# Patient Record
Sex: FEMALE | Race: WHITE | NOT HISPANIC OR LATINO
[De-identification: names, ages, dates, MRNs, and addresses within clinical notes are randomized per-mention and may not be internally consistent; named-entity substitution may affect disease eponyms.]

---

## 2021-07-07 ENCOUNTER — APPOINTMENT (OUTPATIENT)
Dept: HEART AND VASCULAR | Facility: CLINIC | Age: 66
End: 2021-07-07
Payer: MEDICARE

## 2021-07-07 ENCOUNTER — NON-APPOINTMENT (OUTPATIENT)
Age: 66
End: 2021-07-07

## 2021-07-07 VITALS
WEIGHT: 137 LBS | DIASTOLIC BLOOD PRESSURE: 86 MMHG | OXYGEN SATURATION: 99 % | HEART RATE: 93 BPM | HEIGHT: 65 IN | BODY MASS INDEX: 22.82 KG/M2 | SYSTOLIC BLOOD PRESSURE: 143 MMHG

## 2021-07-07 DIAGNOSIS — R06.00 DYSPNEA, UNSPECIFIED: ICD-10-CM

## 2021-07-07 DIAGNOSIS — R94.31 ABNORMAL ELECTROCARDIOGRAM [ECG] [EKG]: ICD-10-CM

## 2021-07-07 PROCEDURE — 99214 OFFICE O/P EST MOD 30 MIN: CPT

## 2021-07-07 PROCEDURE — 93000 ELECTROCARDIOGRAM COMPLETE: CPT

## 2021-07-09 PROBLEM — R06.00 DYSPNEA ON EXERTION: Status: ACTIVE | Noted: 2021-07-09

## 2021-07-09 PROBLEM — R94.31 ABNORMAL EKG: Status: ACTIVE | Noted: 2021-07-09

## 2021-07-23 ENCOUNTER — APPOINTMENT (OUTPATIENT)
Dept: HEART AND VASCULAR | Facility: CLINIC | Age: 66
End: 2021-07-23

## 2021-08-03 ENCOUNTER — FORM ENCOUNTER (OUTPATIENT)
Age: 66
End: 2021-08-03

## 2021-08-04 ENCOUNTER — OUTPATIENT (OUTPATIENT)
Dept: OUTPATIENT SERVICES | Facility: HOSPITAL | Age: 66
LOS: 1 days | End: 2021-08-04
Payer: MEDICARE

## 2021-08-04 DIAGNOSIS — R94.31 ABNORMAL ELECTROCARDIOGRAM [ECG] [EKG]: ICD-10-CM

## 2021-08-04 DIAGNOSIS — R06.00 DYSPNEA, UNSPECIFIED: ICD-10-CM

## 2021-08-04 PROCEDURE — 93306 TTE W/DOPPLER COMPLETE: CPT | Mod: 26

## 2021-08-04 PROCEDURE — 93306 TTE W/DOPPLER COMPLETE: CPT

## 2021-08-04 PROCEDURE — 93351 STRESS TTE COMPLETE: CPT | Mod: 26,52

## 2021-08-04 PROCEDURE — 93351 STRESS TTE COMPLETE: CPT

## 2021-08-17 ENCOUNTER — APPOINTMENT (OUTPATIENT)
Dept: HEART AND VASCULAR | Facility: CLINIC | Age: 66
End: 2021-08-17

## 2021-08-18 ENCOUNTER — TRANSCRIPTION ENCOUNTER (OUTPATIENT)
Age: 66
End: 2021-08-18

## 2021-09-10 ENCOUNTER — OUTPATIENT (OUTPATIENT)
Dept: OUTPATIENT SERVICES | Facility: HOSPITAL | Age: 66
LOS: 1 days | End: 2021-09-10
Payer: MEDICARE

## 2021-09-10 ENCOUNTER — APPOINTMENT (OUTPATIENT)
Dept: CT IMAGING | Facility: HOSPITAL | Age: 66
End: 2021-09-10
Payer: MEDICARE

## 2021-09-10 PROCEDURE — 75574 CT ANGIO HRT W/3D IMAGE: CPT

## 2021-09-10 PROCEDURE — 75574 CT ANGIO HRT W/3D IMAGE: CPT | Mod: 26,MH

## 2021-09-24 ENCOUNTER — APPOINTMENT (OUTPATIENT)
Dept: VASCULAR SURGERY | Facility: CLINIC | Age: 66
End: 2021-09-24
Payer: MEDICARE

## 2021-09-24 PROCEDURE — 93923 UPR/LXTR ART STDY 3+ LVLS: CPT

## 2021-09-26 ENCOUNTER — EMERGENCY (EMERGENCY)
Facility: HOSPITAL | Age: 66
LOS: 1 days | Discharge: HOME | End: 2021-09-26
Admitting: EMERGENCY MEDICINE
Payer: MEDICARE

## 2021-09-26 VITALS
DIASTOLIC BLOOD PRESSURE: 90 MMHG | RESPIRATION RATE: 20 BRPM | TEMPERATURE: 98 F | HEART RATE: 84 BPM | WEIGHT: 132.94 LBS | OXYGEN SATURATION: 98 % | SYSTOLIC BLOOD PRESSURE: 162 MMHG | HEIGHT: 65 IN

## 2021-09-26 DIAGNOSIS — Z20.822 CONTACT WITH AND (SUSPECTED) EXPOSURE TO COVID-19: ICD-10-CM

## 2021-09-26 PROCEDURE — 99282 EMERGENCY DEPT VISIT SF MDM: CPT | Mod: CS

## 2021-09-26 NOTE — ED PROVIDER NOTE - PATIENT PORTAL LINK FT
You can access the FollowMyHealth Patient Portal offered by [facility] by registering at the following website: [email or web address]. By joining "[employer]’s FollowMyHealth portal, you will also be able to view your health information using other applications (apps) compatible with our system.

## 2021-10-06 ENCOUNTER — APPOINTMENT (OUTPATIENT)
Dept: HEART AND VASCULAR | Facility: CLINIC | Age: 66
End: 2021-10-06
Payer: MEDICARE

## 2021-10-06 VITALS
WEIGHT: 133 LBS | DIASTOLIC BLOOD PRESSURE: 88 MMHG | BODY MASS INDEX: 22.16 KG/M2 | HEART RATE: 86 BPM | HEIGHT: 65 IN | SYSTOLIC BLOOD PRESSURE: 144 MMHG | OXYGEN SATURATION: 100 %

## 2021-10-06 VITALS — DIASTOLIC BLOOD PRESSURE: 78 MMHG | SYSTOLIC BLOOD PRESSURE: 137 MMHG

## 2021-10-06 DIAGNOSIS — Z78.9 OTHER SPECIFIED HEALTH STATUS: ICD-10-CM

## 2021-10-06 DIAGNOSIS — E03.9 HYPOTHYROIDISM, UNSPECIFIED: ICD-10-CM

## 2021-10-06 PROCEDURE — 99215 OFFICE O/P EST HI 40 MIN: CPT

## 2021-10-06 NOTE — HISTORY OF PRESENT ILLNESS
[FreeTextEntry1] : Guerline Alonso presents for a follow up and management of HTN, HLD, non-obstructive CAD and LE claudication. She was last seen in July for mild ALEXANDRE and an abnormal ECG. She had a stress echo which was + for exercise ischemia and consequently sent for a CTCA which revealed LAD bridging. Last month she had complaints of LE claudication and ABIs with + results. She is taking other medications but does not remember the names. She will email medications and other diagnostic testings she had recently from [facility]. She is here today for follow up and to discuss LE results. Denies CP, SOB, ALEXANDRE, PND/orthopnea, LE edema, dizziness, palpitations, syncope or near syncope. \par \par \par \par

## 2021-10-06 NOTE — REVIEW OF SYSTEMS
[Muscle Cramps] : muscle cramps [Negative] : Heme/Lymph [Leg Claudication] : intermittent leg claudication

## 2021-10-06 NOTE — DISCUSSION/SUMMARY
[FreeTextEntry1] : All relevant risks and benefits discussed and patient verbalizes understanding and agreement with plan.

## 2021-10-06 NOTE — ASSESSMENT
[FreeTextEntry1] : Non-obstructive CAD: + myocardial bridging, CCS 0.\par - Discussed BB or norvasc but pt will like to defer at this time.\par \par PAD: + Claudication, + RAMSEY \par - Obtain LE Usg \par - Follow up with Dr. Lucero \par \par Hyperlipidemia: 6/21 , HDL 81, TG 91 \par - Continue Atorvastatin 10mg daily. Pt does not want to increase dose at this time.\par - Consider increase in statin post BLE Usg results.\par - Discussed therapeutic lifestyle changes to promote improved lipid metabolism \par \par HTN: BP at ACC/AHA 2017 guideline target \par - Will try lifestyle modification ( diet and exercise) for now.\par \par Hypothyroid: stable\par - Will email name and dose of her medication.\par - Follows up with her PCP\par \par \par \par \par \par

## 2021-10-06 NOTE — REASON FOR VISIT
[Arrhythmia/ECG Abnorrmalities] : arrhythmia/ECG abnormalities [Hyperlipidemia] : hyperlipidemia [Hypertension] : hypertension [Other: ____] : [unfilled] [FreeTextEntry1] : Diagnostic Test: \par ---------------------------------- \par ECG: \par 07/07/2021: Sinus rhythm with NSST depression. \par ---------------------------------- \par Echo: \par 08/04/202: Normal LVF, mild symmetric LVH, Gr 1 DD, mild TR \par ----------------------------------- \par Stress: \par 08/04/2021: Abnormal. + exercise ischemia on ECG. EF 60-65% \par ----------------------------------- \par CTCA: \par 09/13/2021: LM/LCx/RCA normal, mLAD mild stenosis with calcified plaque and dLAD myocardial bridging  \par ----------------------------------- \par Lower extremities arteries: \par 09/24/2021: RAMSYE \par Right: Normal consistent with absence of significant arterial occlusive disease. \par Left: Blunted waveforms with relatively flat appearance noted in the lower extremity at the ankle and metatarsal level. Moderated decrease in pressure and abnormal ankle brachial index suggestive of moderate to severe small vessel disease. \par

## 2021-11-08 ENCOUNTER — NON-APPOINTMENT (OUTPATIENT)
Age: 66
End: 2021-11-08

## 2021-11-11 ENCOUNTER — APPOINTMENT (OUTPATIENT)
Dept: HEART AND VASCULAR | Facility: CLINIC | Age: 66
End: 2021-11-11
Payer: MEDICARE

## 2021-11-11 VITALS
HEART RATE: 80 BPM | BODY MASS INDEX: 22.49 KG/M2 | SYSTOLIC BLOOD PRESSURE: 159 MMHG | WEIGHT: 135 LBS | HEIGHT: 65 IN | TEMPERATURE: 93.5 F | DIASTOLIC BLOOD PRESSURE: 89 MMHG | OXYGEN SATURATION: 98 %

## 2021-11-11 DIAGNOSIS — E78.5 HYPERLIPIDEMIA, UNSPECIFIED: ICD-10-CM

## 2021-11-11 DIAGNOSIS — I73.9 PERIPHERAL VASCULAR DISEASE, UNSPECIFIED: ICD-10-CM

## 2021-11-11 PROCEDURE — 99213 OFFICE O/P EST LOW 20 MIN: CPT

## 2021-11-11 PROCEDURE — 93925 LOWER EXTREMITY STUDY: CPT

## 2021-11-21 PROBLEM — E78.5 HYPERLIPEMIA: Status: ACTIVE | Noted: 2021-11-21

## 2021-11-21 PROBLEM — I73.9 BILATERAL CLAUDICATION OF LOWER LIMB: Status: ACTIVE | Noted: 2021-09-16

## 2022-01-20 ENCOUNTER — APPOINTMENT (OUTPATIENT)
Dept: HEART AND VASCULAR | Facility: CLINIC | Age: 67
End: 2022-01-20
Payer: MEDICARE

## 2022-01-20 VITALS
OXYGEN SATURATION: 99 % | TEMPERATURE: 96.2 F | HEART RATE: 96 BPM | SYSTOLIC BLOOD PRESSURE: 144 MMHG | WEIGHT: 136 LBS | DIASTOLIC BLOOD PRESSURE: 77 MMHG | HEIGHT: 65.5 IN | BODY MASS INDEX: 22.39 KG/M2

## 2022-01-20 PROCEDURE — 99213 OFFICE O/P EST LOW 20 MIN: CPT

## 2022-01-20 PROCEDURE — 99203 OFFICE O/P NEW LOW 30 MIN: CPT

## 2022-02-17 NOTE — REASON FOR VISIT
[FreeTextEntry1] : 65 yo female with family history of cad ( mother with cabg at 65), who presents for evaluation of PAD. She reports that for last two years she has been having pain her her calves and hip joint on exertion. She also complains of leg/hip pain with rest. She underwent an aterial duplex that did not show any significant  fem-pop disease but some below the knee disease. \par

## 2022-02-17 NOTE — ASSESSMENT
[FreeTextEntry1] : Moderate  claudication of left >right lower extremity/Grade 1 category 2 [place] Class:\par RAMSEY PVR consistent with moderate disease.\par Her arterial duplex didn’t show any significant fem-pop disease and at least a two vessel run off bilaterally.\par I doubt her symptoms are related to PAD, and are likely due to hip joint arthritis.\par I will see her back in follow up in 1 year, and she does not wish to proceed with a CTA with run off and I think its very reasonable to hold off at this time.

## 2022-11-02 ENCOUNTER — APPOINTMENT (OUTPATIENT)
Dept: HEART AND VASCULAR | Facility: CLINIC | Age: 67
End: 2022-11-02

## 2024-02-22 ENCOUNTER — APPOINTMENT (OUTPATIENT)
Dept: HEART AND VASCULAR | Facility: CLINIC | Age: 69
End: 2024-02-22
Payer: MEDICARE

## 2024-02-22 VITALS
OXYGEN SATURATION: 95 % | HEART RATE: 84 BPM | DIASTOLIC BLOOD PRESSURE: 68 MMHG | SYSTOLIC BLOOD PRESSURE: 117 MMHG | WEIGHT: 141.13 LBS | BODY MASS INDEX: 23.23 KG/M2 | HEIGHT: 65.5 IN

## 2024-02-22 PROCEDURE — 99214 OFFICE O/P EST MOD 30 MIN: CPT | Mod: 25

## 2024-02-22 PROCEDURE — 36415 COLL VENOUS BLD VENIPUNCTURE: CPT

## 2024-02-22 PROCEDURE — 93000 ELECTROCARDIOGRAM COMPLETE: CPT

## 2024-02-22 RX ORDER — ATORVASTATIN CALCIUM 40 MG/1
40 TABLET, FILM COATED ORAL DAILY
Refills: 0 | Status: ACTIVE | COMMUNITY

## 2024-02-22 RX ORDER — ATORVASTATIN CALCIUM 10 MG/1
10 TABLET, FILM COATED ORAL
Qty: 90 | Refills: 3 | Status: DISCONTINUED | COMMUNITY
Start: 2021-09-16 | End: 2024-02-22

## 2024-02-22 NOTE — DISCUSSION/SUMMARY
[EKG obtained to assist in diagnosis and management of assessed problem(s)] : EKG obtained to assist in diagnosis and management of assessed problem(s) [FreeTextEntry1] : All relevant risks and benefits discussed and patient verbalizes understanding and agreement with plan.

## 2024-02-22 NOTE — REASON FOR VISIT
[FreeTextEntry1] : Diagnostic Test:  ----------------------------------  EC2024: Sinus rhythm with NSST depression.  2021: Sinus rhythm with NSST depression.  ----------------------------------  Echo:  : Normal LVF, mild symmetric LVH, Gr 1 DD, mild TR  -----------------------------------  Stress:  2021: Abnormal. + exercise ischemia on ECG. EF 60-65%  -----------------------------------  CCTA:  2021: LM/LCx/RCA normal, mLAD mild stenosis with calcified plaque and dLAD myocardial bridging   -----------------------------------  Lower extremities arteries:  2021: RAMSEY  Right: Normal consistent with absence of significant arterial occlusive disease.  Left: Blunted waveforms with relatively flat appearance noted in the lower extremity at the ankle and metatarsal level. Moderated decrease in pressure and abnormal ankle brachial index suggestive of moderate to severe small vessel disease.

## 2024-02-22 NOTE — ASSESSMENT
[FreeTextEntry1] : Non-obstructive CAD: + myocardial bridging, no CP. - Discussed BB or norvasc but pt will like to defer at this time. - Reassured patient.  ALEXANDRE: mild - Reassured patient - Will obtain CALVIN if symptoms worsens  Hyperlipidemia: 6/21 , HDL 81, TG 91  - Continue Atorvastatin 10mg daily. Pt does not want to increase dose at this time. - Repeat lipids today - Discussed therapeutic lifestyle changes to promote improved lipid metabolism   HTN: BP at ACC/AHA 2017 guideline target  - Continue lifestyle modification ( diet and exercise) for now.  Hypothyroid: stable - Nature Thyroid 125mg daily - Follows up with her PCP - Obtain labs today

## 2024-02-22 NOTE — HISTORY OF PRESENT ILLNESS
[FreeTextEntry1] : Guerline Alonso presents for a follow up and management of HTN, HLD, non-obstructive CAD and LE claudication. She was last seen by me in 2021 for mild ALEXANDRE and an abnormal ECG. Her stress echo which was + for exercise ischemia and consequently sent for a CCTA which revealed LAD bridging. Last visit she had complaints of LE claudication and ABIs with + results. She followed-up with Dr. Lucero but no interventions at that time as her arterial duplex did not show significant fem-pop disease. Denies CP, PND/orthopnea, LE edema, dizziness, palpitations, syncope or near syncope but mild ALEXANDRE at times. She was recently on a hiking trip ([place], [place]), she uses a treadmill for 30min-1 hour, does squats, yoga and pilates 4X/wk. She follows a heart healthy diet.

## 2024-02-26 ENCOUNTER — NON-APPOINTMENT (OUTPATIENT)
Age: 69
End: 2024-02-26

## 2024-02-26 LAB
ALBUMIN SERPL ELPH-MCNC: 4.3 G/DL
ALP BLD-CCNC: 55 U/L
ALT SERPL-CCNC: 36 U/L
ANION GAP SERPL CALC-SCNC: 9 MMOL/L
AST SERPL-CCNC: 38 U/L
BILIRUB SERPL-MCNC: 0.2 MG/DL
BUN SERPL-MCNC: 9 MG/DL
CALCIUM SERPL-MCNC: 9.2 MG/DL
CHLORIDE SERPL-SCNC: 105 MMOL/L
CHOLEST SERPL-MCNC: 183 MG/DL
CO2 SERPL-SCNC: 26 MMOL/L
CREAT SERPL-MCNC: 0.74 MG/DL
EGFR: 88 ML/MIN/1.73M2
GLUCOSE SERPL-MCNC: 78 MG/DL
HCT VFR BLD CALC: 38.4 %
HDLC SERPL-MCNC: 86 MG/DL
HGB BLD-MCNC: 12.4 G/DL
LDLC SERPL CALC-MCNC: 79 MG/DL
MCHC RBC-ENTMCNC: 31.6 PG
MCHC RBC-ENTMCNC: 32.3 GM/DL
MCV RBC AUTO: 98 FL
NONHDLC SERPL-MCNC: 97 MG/DL
PLATELET # BLD AUTO: 265 K/UL
POTASSIUM SERPL-SCNC: 5.2 MMOL/L
PROT SERPL-MCNC: 6.7 G/DL
RBC # BLD: 3.92 M/UL
RBC # FLD: 14.7 %
SODIUM SERPL-SCNC: 141 MMOL/L
TRIGL SERPL-MCNC: 102 MG/DL
TSH SERPL-ACNC: 0.19 UIU/ML
WBC # FLD AUTO: 5.88 K/UL

## 2024-06-07 ENCOUNTER — EMERGENCY (EMERGENCY)
Facility: HOSPITAL | Age: 69
LOS: 1 days | Discharge: HOME | End: 2024-06-07
Attending: EMERGENCY MEDICINE | Admitting: EMERGENCY MEDICINE
Payer: MEDICARE

## 2024-06-07 VITALS
RESPIRATION RATE: 18 BRPM | SYSTOLIC BLOOD PRESSURE: 157 MMHG | TEMPERATURE: 98 F | DIASTOLIC BLOOD PRESSURE: 93 MMHG | HEART RATE: 83 BPM | WEIGHT: 136.91 LBS | OXYGEN SATURATION: 98 % | HEIGHT: 65 IN

## 2024-06-07 PROCEDURE — 99284 EMERGENCY DEPT VISIT MOD MDM: CPT

## 2024-06-07 NOTE — ED ADULT TRIAGE NOTE - CHIEF COMPLAINT QUOTE
mechanical trip and fall landed on right knee, denies use of blood thinners, denies head strike, denies any other injuries, unclear on tdap status

## 2024-06-08 PROCEDURE — 73564 X-RAY EXAM KNEE 4 OR MORE: CPT | Mod: 26,RT

## 2024-06-08 RX ORDER — TETANUS TOXOID, REDUCED DIPHTHERIA TOXOID AND ACELLULAR PERTUSSIS VACCINE, ADSORBED 5; 2.5; 8; 8; 2.5 [IU]/.5ML; [IU]/.5ML; UG/.5ML; UG/.5ML; UG/.5ML
0.5 SUSPENSION INTRAMUSCULAR ONCE
Refills: 0 | Status: COMPLETED | OUTPATIENT
Start: 2024-06-08 | End: 2024-06-08

## 2024-06-08 RX ORDER — IBUPROFEN 200 MG
600 TABLET ORAL ONCE
Refills: 0 | Status: COMPLETED | OUTPATIENT
Start: 2024-06-08 | End: 2024-06-08

## 2024-06-08 RX ADMIN — TETANUS TOXOID, REDUCED DIPHTHERIA TOXOID AND ACELLULAR PERTUSSIS VACCINE, ADSORBED 0.5 MILLILITER(S): 5; 2.5; 8; 8; 2.5 SUSPENSION INTRAMUSCULAR at 01:33

## 2024-06-08 RX ADMIN — Medication 600 MILLIGRAM(S): at 01:39

## 2024-06-08 NOTE — ED PROVIDER NOTE - NSFOLLOWUPINSTRUCTIONS_ED_ALL_ED_FT
A contusion is a deep bruise. Contusions are the result of a blunt injury to tissues and muscle fibers under the skin. The injury causes bleeding under the skin. The skin overlying the contusion may turn blue, purple, or yellow. Minor injuries will give you a painless contusion, but more severe injuries cause contusions that may stay painful and swollen for a few weeks.    Follow these instructions at home:  Pay attention to any changes in your symptoms. Let your health care provider know about them. Take these actions to relieve your pain.        Managing pain, stiffness, and swelling     Use resting, icing, applying pressure (compression), and raising (elevating) the injured area. This is often called the RICE strategy.    Rest the injured area. Return to your normal activities as told by your health care provider. Ask your health care provider what activities are safe for you.  If directed, put ice on the injured area:    Put ice in a plastic bag.  Place a towel between your skin and the bag.  Leave the ice on for 20 minutes, 2–3 times per day.  If directed, apply light compression to the injured area using an elastic bandage. Make sure the bandage is not wrapped too tightly. Remove and reapply the bandage as directed by your health care provider.  If possible, raise (elevate) the injured area above the level of your heart while you are sitting or lying down.        General instructions    Take over-the-counter and prescription medicines only as told by your health care provider.  Keep all follow-up visits as told by your health care provider. This is important.    Contact a health care provider if:  Your symptoms do not improve after several days of treatment.  Your symptoms get worse.  You have difficulty moving the injured area.    Get help right away if:  You have severe pain.  You have numbness in a hand or foot.  Your hand or foot turns pale or cold.    Summary  A contusion is a deep bruise.  Contusions are the result of a blunt injury to tissues and muscle fibers under the skin.  It is treated with rest, ice, compression, and elevation. You may be given over-the-counter medicines for pain.  Contact a health care provider if your symptoms do not improve, or get worse.   Get help right away if you have severe pain, have numbness, or the area turns pale or cold.

## 2024-06-08 NOTE — ED ADULT NURSE NOTE - OBJECTIVE STATEMENT
68 y/o F here with mechanical trip and fall landed on right knee, denies use of blood thinners, denies head strike, denies any other injuries, unclear on tdap status

## 2024-06-08 NOTE — ED PROVIDER NOTE - CARE PROVIDER_API CALL
Corey Castro  Orthopaedic Surgery  [address] [place] [address], [address]  [place] [address]  Phone: [phone]  Fax: [phone]  Follow Up Time:

## 2024-07-31 NOTE — ED ADULT NURSE NOTE - CHIEF COMPLAINT
Ndc (300 Mg Prefilled Syringe): 9627-6719-63 Was The Medication Purchased By The Clinic?: No Administered By (Optional): Amber Petty MA Render If Medication Purchased By Clinic In Visit Note?: Yes Lot # (Optional): [id number] Ndc (200 Mg Prefilled Pen): 7283-6045-40 13370 Billing Preferences: 1 J-Code:  Ndc (300 Mg Prefilled Pen): 7489-5287-43 Detail Level: None Syringe Size Used (Required For Enhanced Ndc): 300 mg/2ml prefilled pen Dupixent Dosing: 600 mg Ndc (200 Mg Prefilled Syringe): 6793-3336-16 Date Of Next Injection: 2 Weeks Consent: The risks of pain and injection site reactions were reviewed with the patient prior to the injection. Expiration Date (Optional): 2026-02-28 The patient is a 69y Female complaining of fall down stairs.

## 2025-03-27 ENCOUNTER — NON-APPOINTMENT (OUTPATIENT)
Age: 70
End: 2025-03-27

## 2025-03-27 ENCOUNTER — APPOINTMENT (OUTPATIENT)
Dept: HEART AND VASCULAR | Facility: CLINIC | Age: 70
End: 2025-03-27

## 2025-03-27 VITALS
OXYGEN SATURATION: 97 % | DIASTOLIC BLOOD PRESSURE: 70 MMHG | WEIGHT: 142 LBS | SYSTOLIC BLOOD PRESSURE: 153 MMHG | HEIGHT: 65.5 IN | BODY MASS INDEX: 23.37 KG/M2 | TEMPERATURE: 97.9 F | HEART RATE: 77 BPM

## 2025-03-27 VITALS — SYSTOLIC BLOOD PRESSURE: 133 MMHG | DIASTOLIC BLOOD PRESSURE: 73 MMHG

## 2025-03-27 DIAGNOSIS — E78.5 HYPERLIPIDEMIA, UNSPECIFIED: ICD-10-CM

## 2025-03-27 DIAGNOSIS — I25.10 ATHEROSCLEROTIC HEART DISEASE OF NATIVE CORONARY ARTERY W/OUT ANGINA PECTORIS: ICD-10-CM

## 2025-03-27 PROCEDURE — 99214 OFFICE O/P EST MOD 30 MIN: CPT

## 2025-03-27 PROCEDURE — 93000 ELECTROCARDIOGRAM COMPLETE: CPT

## 2025-03-27 PROCEDURE — G2211 COMPLEX E/M VISIT ADD ON: CPT

## 2025-03-27 RX ORDER — LIOTHYRONINE SODIUM 5 UG/1
5 TABLET ORAL
Refills: 0 | Status: ACTIVE | COMMUNITY

## 2025-03-27 RX ORDER — PROGESTERONE 100 MG/1
100 CAPSULE ORAL
Refills: 0 | Status: ACTIVE | COMMUNITY

## 2025-03-27 RX ORDER — ESTRADIOL 0.1MG/24HR
0.1 PATCH, TRANSDERMAL SEMIWEEKLY TRANSDERMAL
Refills: 0 | Status: ACTIVE | COMMUNITY

## 2025-03-27 RX ORDER — LEVOTHYROXINE SODIUM 88 UG/1
88 TABLET ORAL
Refills: 0 | Status: ACTIVE | COMMUNITY

## 2025-06-10 PROBLEM — R07.89 ATYPICAL CHEST PAIN: Status: ACTIVE | Noted: 2025-06-10

## 2025-07-24 ENCOUNTER — RESULT REVIEW (OUTPATIENT)
Age: 70
End: 2025-07-24

## 2025-07-24 ENCOUNTER — OUTPATIENT (OUTPATIENT)
Dept: OUTPATIENT SERVICES | Facility: HOSPITAL | Age: 70
LOS: 1 days | End: 2025-07-24
Payer: MEDICARE

## 2025-07-24 DIAGNOSIS — R07.89 OTHER CHEST PAIN: ICD-10-CM

## 2025-07-24 DIAGNOSIS — R06.09 OTHER FORMS OF DYSPNEA: ICD-10-CM

## 2025-07-24 PROCEDURE — 93351 STRESS TTE COMPLETE: CPT | Mod: 26,52

## 2025-07-24 PROCEDURE — 93351 STRESS TTE COMPLETE: CPT

## 2025-07-28 ENCOUNTER — NON-APPOINTMENT (OUTPATIENT)
Age: 70
End: 2025-07-28